# Patient Record
Sex: MALE | Race: WHITE | NOT HISPANIC OR LATINO | Employment: UNEMPLOYED | URBAN - METROPOLITAN AREA
[De-identification: names, ages, dates, MRNs, and addresses within clinical notes are randomized per-mention and may not be internally consistent; named-entity substitution may affect disease eponyms.]

---
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
## 2025-01-21 ENCOUNTER — TELEPHONE (OUTPATIENT)
Dept: DERMATOLOGY | Facility: CLINIC | Age: 20
End: 2025-01-21
Payer: COMMERCIAL

## 2025-01-21 NOTE — TELEPHONE ENCOUNTER
"Attempted PT contact for Xeljanz Tab 5mg from [facility]. \"We have made several attempts to contact the PT, please notify us if there have been changes to PT's therapy or updated contact number\"   Order #: [id number]  Tel: [phone]  Fax: [phone]    Pavel Segovia MA on 1/21/2025 at 12:01 PM    "

## 2025-04-15 ENCOUNTER — VIRTUAL VISIT (OUTPATIENT)
Dept: PHARMACY | Facility: CLINIC | Age: 20
End: 2025-04-15
Attending: DERMATOLOGY
Payer: COMMERCIAL

## 2025-04-15 DIAGNOSIS — M33.13 DERMATOMYOSITIS (H): Primary | ICD-10-CM

## 2025-04-15 NOTE — PROGRESS NOTES
Medication Therapy Management (MTM) Encounter    ASSESSMENT:                            Medication Adherence/Access: No issues identified.    Juvenile Dermatomyositis   Adam is doing well on medication therapy with Xeljanz and hydroxychloroquine and would benefit from continuation. Discussed need for updated lab monitoring, he has a plan in place to get these done with his next office visit. Also discussed need for annual eye exam while on therapy with hydroxychloroquine and has an ophthalmology appointment scheduled for May.    PLAN:                            Continue Xeljanz and hydroxychloroquine.  Annual eye exam while on therapy with hydroxychloroquine, last done 5/2024.  Continue triamcinolone ointment as needed.  Lab monitoring: CBC, CMP, CK, CRP, lipids every 3-4 months     Follow-up: with Dr. Matos 6/9/2025, with me in around 6 months for follow-up and [facility].    SUBJECTIVE/OBJECTIVE:                          Adam Pedraza is a 19 year old male seen for a follow-up visit.       Reason for visit: Xeljanz, hydroxychloroquine follow-up.    Allergies/ADRs: Reviewed in chart and None  Past Medical History: Reviewed in chart  Tobacco: He reports that he has never smoked. He has never been exposed to tobacco smoke. He has never used smokeless tobacco.  Alcohol: Reviewed in chart    Medication Adherence/Access: no issues reported.    Juvenile Dermatomyositis   - Xeljanz 5 mg twice daily   - Hydroxychloroquine 400mg every day  (last eye exam 5/17/2024)  - Triamcinolone 0.1% ointment twice daily as needed to foot     Side effects: none reported.    Patient reports that he seems to be stable. Rash has been fairly consistent on his hand but seems like it is getting better slowly. Thinks that it is starting to look lighter. Foot has completely resolved. Tolerating the medications well without concern for side effects. No issues with specialty pharmacy or insurance.     Symptoms: rash    Affected areas  include the hand, foot - resolved     Specialist: Dr. Santos Matos MD, Dermatology. Last visit on 11/27/2024.     Previous treatment:   - Mycophenolate   - Methotrexate oral  - Prednisone    Pre-Biologic Screening:   Hep B Core Antibody  Non-reactive (2/21/2023)    Hep C Antibody  Non-reactive (2/21/2023)    Quantiferon TB Gold Negative (2/21/2023)       Last lab monitoring completed: 11/27/2024    Immunization History   Pneumococcal  Prevnar-13: Primary series  Prevnar-20: 6/13/2024 Up-to-date   Tetanus/Tdap  Up-to-date   Shingrix Up-to-date   All patients on biologics should avoid live vaccines (varicella/VZV, intranasal influenza, MMR, or yellow fever vaccine (if traveling))       Today's Vitals: There were no vitals taken for this visit.  ----------------    I spent 10 minutes with this patient today. All changes were made via collaborative practice agreement with Santos Matos.     A summary of these recommendations was sent via [employer].    Lynda Gonzales, PharmD  Medication Therapy Management Pharmacist  [facility]  Phone: [phone]    Telemedicine Visit Details  The patient's medications can be safely assessed via a telemedicine encounter.  Type of service:  Telephone visit  Originating Location (pt. Location): Home    Distant Location (provider location):  Off-site  Start Time: 9:30 AM  End Time: 9:40 AM     Medication Therapy Recommendations  No medication therapy recommendations to display

## 2025-04-15 NOTE — PATIENT INSTRUCTIONS
"Recommendations from today's MTM visit:                                                      Continue Xeljanz and hydroxychloroquine.  Annual eye exam while on therapy with hydroxychloroquine, last done 5/2024.  Continue triamcinolone ointment as needed.  Lab monitoring: CBC, CMP, CK, CRP, lipids every 3-4 months     Follow-up: with Dr. Matos 6/9/2025, with me in around 6 months for follow-up and via [employer] as needed.    It was great speaking with you today.  I value your experience and would be very thankful for your time in providing feedback in our clinic survey. In the next few days, you may receive an email or text message from [employer] with a link to a survey related to your  clinical pharmacist.\"     To schedule another MTM appointment, please call the clinic directly or you may call the MTM scheduling line at [phone].    My Clinical Pharmacist's contact information:                                                      Please feel free to contact me with any questions or concerns you have.      Lynda Gonzales, PharmD  Medication Therapy Management Pharmacist  [facility]  Phone: [phone]   "

## 2025-04-28 ENCOUNTER — TELEPHONE (OUTPATIENT)
Dept: DERMATOLOGY | Facility: CLINIC | Age: 20
End: 2025-04-28
Payer: COMMERCIAL

## 2025-04-28 NOTE — TELEPHONE ENCOUNTER
Called patient and LVM to call back [phone]. Dr. Matos has OLIVER slot at 12:15 PM on 11/26/25.    Wendy Dunne on 4/28/2025 at 8:35 AM

## 2025-04-28 NOTE — TELEPHONE ENCOUNTER
[facility] Call Center    Phone Message    May a detailed message be left on voicemail: yes     Reason for Call: Appointment Intake    Referring Provider Name: NA  Diagnosis and/or Symptoms: dermatomyositis follow-up  Pt's Appt for 11/26 was canceled, due to provider. Pt was here for Thanksgiving.   If we are unable to reschedule a different time 11/26, the next available dates pt is available is the week in Jan is week of 05, week of 22, or week of 29  Only the [facility] location.   Please call pt's mom Sonia (on the consents to communicate) at [phone]. Thanks     Action Taken: Message routed to:  Adult Clinics: Dermatology p [id number]    Travel Screening: Not Applicable     Date of Service:

## 2025-04-29 NOTE — TELEPHONE ENCOUNTER
Called pt but the mailbox was full and unable to LVM.    Cindy Chase LPN on 4/29/2025 at 8:47 AM

## 2025-04-29 NOTE — TELEPHONE ENCOUNTER
Talked to mom and rescheduled Nov 26th appt for Dec 22nd at 0845    Cindy Chase LPN on 4/29/2025 at 9:02 AM

## 2025-06-06 NOTE — PROGRESS NOTES
[facility] Dermatology Note    Encounter Date: Jun 9, 2025    Dermatology Problem List:  1.  Juvenile dermatomyositis, with history of cutaneous and muscle involvement  - s/p bx 10/20/2022 ([facility])  - +CK 3900 (3/2023), now normalized  - +DEREK, negative myositis panel  - current tx: tofacitinib 5mg bid (5/16/24-present), hydroxychloroquine 400 mg daily (3/20/23 - present)  - in reserve: IVIg, anifrolumab  - past: oral prednisone 3/20/23 - 5/9/23, mycophenolate (off 6/2024), methotrexate 25 mg (3/20/23 - 11/27/24)  - History of KOH-R dorsal foot (5/16/24)    ______________________________________    Impression/Plan:    Juvenile dermatomyositis: overall doing very well on tofacitinib 5 mg BID and hydroxychloroquine 400mg daily. Minimal skin activity and no muscle or pulmonary symptoms.  - Cont tofacitinib 5 mg BID, hydroxychloroquine 400 mg daily, topicals PRN. Renewed today.   - check CBC, CMP, CK, CRP, lipids today.       Follow-up in as scheduled 12/2025.       Staff Involved:  Staff/Scribe/Resident    Resident:  I saw and discussed the patient with the attending physician, Dr. Matos.     Bolivar Mcdaniels MD, PhD  PGY-4 Dermatology Resident     Scribe Disclosure:   I, Michelle Escamilla, am serving as a scribe to document services personally performed by Santos Matos MD based on data collection and the provider's statements to me.     Provider Disclosure:   The documentation recorded by the scribe accurately reflects the services I personally performed and the decisions made by me.    Staff Physician Comments:   I saw and evaluated the patient with the resident and I edited the assessment and plan as documented in the note. I was present for the examination.    Santos Matos MD   of Dermatology  Department of Dermatology  [facility]              CC:   Chief Complaint   Patient presents with    RECHECK      Juvenile dermatomyositis.  Current treatment: tofacitinib 5 mg BID, hydroxychloroquine       History of Present Illness:  Mr. Eduardo Pedraza is a 20 year old male who presents as a return patient.    Today, patient reports he is doing very well. Tapered off methotrexate late 2024 and has continued to do well, no flares, no concerns today. Mild involvement on the dorsal hands but much improved from prior and not bothersome. No weakness/pain/fatigue/SOB.     Labs:  Component      Latest Ref Rng 11/27/2024  8:28 AM   WBC      4.0 - 11.0 10e3/uL 3.9 (L)    RBC Count      4.40 - 5.90 10e6/uL 5.27    Hemoglobin      13.3 - 17.7 g/dL 16.7    Hematocrit      40.0 - 53.0 % 46.5    MCV      78 - 100 fL 88    MCH      26.5 - 33.0 pg 31.7    MCHC      31.5 - 36.5 g/dL 35.9    RDW      10.0 - 15.0 % 12.5    Platelet Count      150 - 450 10e3/uL 195    % Neutrophils      % 60    % Lymphocytes      % 26    % Monocytes      % 11    % Eosinophils      % 2    % Basophils      % 1    % Immature Granulocytes      % 0    NRBC/W      <1 /100 0    Absolute Neutrophil      1.6 - 8.3 10e3/uL 2.4    Absolute Lymphocytes      0.8 - 5.3 10e3/uL 1.0    Absolute Monocytes      0.0 - 1.3 10e3/uL 0.4    Absolute Eosinophils      0.0 - 0.7 10e3/uL 0.1    Absolute Basophils      0.0 - 0.2 10e3/uL 0.0    Absolute Immature Granulocytes      <=0.4 10e3/uL 0.0    Absolute NRBCs      10e3/uL 0.0    Sodium      135 - 145 mmol/L 139    Potassium      3.4 - 5.3 mmol/L 4.0    Carbon Dioxide (CO2)      22 - 29 mmol/L 26    Anion Gap      7 - 15 mmol/L 10    Urea Nitrogen      6.0 - 20.0 mg/dL 11.8    Creatinine      0.67 - 1.17 mg/dL 0.78    GFR Estimate      >60 mL/min/1.73m2 >90    Calcium      8.8 - 10.4 mg/dL 9.4    Chloride      98 - 107 mmol/L 103    Glucose      70 - 99 mg/dL 93    Alkaline Phosphatase      65 - 260 U/L 64 (L)    AST      0 - 35 U/L 41 (H)    ALT      0 - 50 U/L 34    Protein Total      6.4 - 8.3 g/dL 6.9    Albumin      3.5 - 5.2  g/dL 4.6    Bilirubin Total      <=1.2 mg/dL 0.8    Patient Fasting? Yes    Patient Fasting? Yes    Cholesterol      <170 mg/dL 143    Triglycerides      <90 mg/dL 31    HDL Cholesterol      >45 mg/dL 75    LDL Cholesterol Calculated      <110 mg/dL 62    Non HDL Cholesterol      <120 mg/dL 68    CK Total      39 - 308 U/L 164    CRP Inflammation      <5.00 mg/L <3.00       Legend:  (L) Low  (H) High    Physical exam:  Vitals: There were no vitals taken for this visit.  GEN: This is a well developed, well-nourished male in no acute distress, in a pleasant mood.    SKIN: Hayes phototype II  - Faint red/pink papules on the MCP/PIP, most prominent on index/middle fingers. Improved from prior.   - No other lesions of concern on areas examined.         Past Medical History:   Past Medical History:   Diagnosis Date    Asthma     Distal radius fracture, left 12/14/2019    Juvenile dermatomyositis (H)      Past Surgical History:   Procedure Laterality Date    ADENOIDECTOMY      TONSILLECTOMY         Social History:   reports that he has never smoked. He has never been exposed to tobacco smoke. He has never used smokeless tobacco.    Family History:  Family History   Problem Relation Age of Onset    Glaucoma No family hx of     Macular Degeneration No family hx of        Medications:  Current Outpatient Medications   Medication Sig Dispense Refill    hydroxychloroquine (PLAQUENIL) 200 MG tablet Take 2 tablets (400 mg) by mouth daily. 180 tablet 3    tofacitinib (XELJANZ) 5 MG tablet Take 1 tablet (5 mg) by mouth 2 times daily. 60 tablet 11    triamcinolone (KENALOG) 0.1 % external ointment Apply topically 2 times daily. For affected areas of rash on feet 80 g 11     No Known Allergies

## 2025-06-09 ENCOUNTER — TELEPHONE (OUTPATIENT)
Dept: DERMATOLOGY | Facility: CLINIC | Age: 20
End: 2025-06-09

## 2025-06-09 ENCOUNTER — RESULTS FOLLOW-UP (OUTPATIENT)
Dept: DERMATOLOGY | Facility: CLINIC | Age: 20
End: 2025-06-09

## 2025-06-09 ENCOUNTER — OFFICE VISIT (OUTPATIENT)
Dept: DERMATOLOGY | Facility: CLINIC | Age: 20
End: 2025-06-09
Payer: COMMERCIAL

## 2025-06-09 DIAGNOSIS — M33.13 DERMATOMYOSITIS (H): Primary | ICD-10-CM

## 2025-06-09 DIAGNOSIS — D84.9 IMMUNOSUPPRESSION: ICD-10-CM

## 2025-06-09 LAB
ALBUMIN SERPL BCG-MCNC: 4.6 G/DL (ref 3.5–5.2)
ALP SERPL-CCNC: 58 U/L (ref 40–150)
ALT SERPL W P-5'-P-CCNC: 30 U/L (ref 0–70)
ANION GAP SERPL CALCULATED.3IONS-SCNC: 9 MMOL/L (ref 7–15)
AST SERPL W P-5'-P-CCNC: 36 U/L (ref 0–45)
BASOPHILS # BLD AUTO: 0 10E3/UL (ref 0–0.2)
BASOPHILS NFR BLD AUTO: 1 %
BILIRUB SERPL-MCNC: 0.9 MG/DL
BUN SERPL-MCNC: 12.1 MG/DL (ref 6–20)
CALCIUM SERPL-MCNC: 9.8 MG/DL (ref 8.8–10.4)
CHLORIDE SERPL-SCNC: 106 MMOL/L (ref 98–107)
CHOLEST SERPL-MCNC: 129 MG/DL
CK SERPL-CCNC: 184 U/L (ref 39–308)
CREAT SERPL-MCNC: 0.88 MG/DL (ref 0.67–1.17)
CRP SERPL-MCNC: <3 MG/L
EGFRCR SERPLBLD CKD-EPI 2021: >90 ML/MIN/1.73M2
EOSINOPHIL # BLD AUTO: 0.1 10E3/UL (ref 0–0.7)
EOSINOPHIL NFR BLD AUTO: 2 %
ERYTHROCYTE [DISTWIDTH] IN BLOOD BY AUTOMATED COUNT: 12.3 % (ref 10–15)
FASTING STATUS PATIENT QL REPORTED: NO
FASTING STATUS PATIENT QL REPORTED: NO
GLUCOSE SERPL-MCNC: 93 MG/DL (ref 70–99)
HCO3 SERPL-SCNC: 25 MMOL/L (ref 22–29)
HCT VFR BLD AUTO: 43.9 % (ref 40–53)
HDLC SERPL-MCNC: 70 MG/DL
HGB BLD-MCNC: 15.9 G/DL (ref 13.3–17.7)
IMM GRANULOCYTES # BLD: 0 10E3/UL
IMM GRANULOCYTES NFR BLD: 0 %
LDLC SERPL CALC-MCNC: 50 MG/DL
LYMPHOCYTES # BLD AUTO: 1.2 10E3/UL (ref 0.8–5.3)
LYMPHOCYTES NFR BLD AUTO: 28 %
MCH RBC QN AUTO: 31 PG (ref 26.5–33)
MCHC RBC AUTO-ENTMCNC: 36.2 G/DL (ref 31.5–36.5)
MCV RBC AUTO: 86 FL (ref 78–100)
MONOCYTES # BLD AUTO: 0.4 10E3/UL (ref 0–1.3)
MONOCYTES NFR BLD AUTO: 9 %
NEUTROPHILS # BLD AUTO: 2.5 10E3/UL (ref 1.6–8.3)
NEUTROPHILS NFR BLD AUTO: 60 %
NONHDLC SERPL-MCNC: 59 MG/DL
NRBC # BLD AUTO: 0 10E3/UL
NRBC BLD AUTO-RTO: 0 /100
PLATELET # BLD AUTO: 196 10E3/UL (ref 150–450)
POTASSIUM SERPL-SCNC: 4.3 MMOL/L (ref 3.4–5.3)
PROT SERPL-MCNC: 7 G/DL (ref 6.4–8.3)
RBC # BLD AUTO: 5.13 10E6/UL (ref 4.4–5.9)
SODIUM SERPL-SCNC: 140 MMOL/L (ref 135–145)
TRIGL SERPL-MCNC: 46 MG/DL
WBC # BLD AUTO: 4.2 10E3/UL (ref 4–11)

## 2025-06-09 RX ORDER — TRIAMCINOLONE ACETONIDE 1 MG/G
OINTMENT TOPICAL 2 TIMES DAILY
Qty: 80 G | Refills: 11 | Status: SHIPPED | OUTPATIENT
Start: 2025-06-09

## 2025-06-09 RX ORDER — HYDROXYCHLOROQUINE SULFATE 200 MG/1
400 TABLET, FILM COATED ORAL DAILY
Qty: 180 TABLET | Refills: 3 | Status: SHIPPED | OUTPATIENT
Start: 2025-06-09

## 2025-06-09 NOTE — TELEPHONE ENCOUNTER
PA Initiation    Medication: XELJANZ 5 MG PO TABS  Insurance Company: [employer] ([facility]) - Phone [phone] Fax [phone]  Pharmacy Filling the Rx: [facility] - [place], [place] - [address]  Filling Pharmacy Phone: [phone]  Filling Pharmacy Fax: [phone]  Start Date: 6/9/2025         Thank you,     Augustine Mahoney [facility]  Pharmacy Clinic [facility]  [email or web address]   Phone: [phone]  Fax: [phone]

## 2025-06-09 NOTE — LETTER
6/9/2025      Eduardo Pedraza  [address] [place] [address]      Dear Colleague,    Thank you for referring your patient, Eduardo Pedraza, to the [facility]. Please see a copy of my visit note below.    [facility] Dermatology Note    Encounter Date: Jun 9, 2025    Dermatology Problem List:  1.  Juvenile dermatomyositis, with history of cutaneous and muscle involvement  - s/p bx 10/20/2022 ([facility])  - +CK 3900 (3/2023), now normalized  - +DEREK, negative myositis panel  - current tx: tofacitinib 5mg bid (5/16/24-present), hydroxychloroquine 400 mg daily (3/20/23 - present)  - in reserve: IVIg, anifrolumab  - past: oral prednisone 3/20/23 - 5/9/23, mycophenolate (off 6/2024), methotrexate 25 mg (3/20/23 - 11/27/24)  - History of KOH-R dorsal foot (5/16/24)    ______________________________________    Impression/Plan:    Juvenile dermatomyositis: overall doing very well on tofacitinib 5 mg BID and hydroxychloroquine 400mg daily. Minimal skin activity and no muscle or pulmonary symptoms.  - Cont tofacitinib 5 mg BID, hydroxychloroquine 400 mg daily, topicals PRN. Renewed today.   - check CBC, CMP, CK, CRP, lipids today.       Follow-up in as scheduled 12/2025.       Staff Involved:  Staff/Scribe/Resident    Resident:  I saw and discussed the patient with the attending physician, Dr. Matos.     Bolivar Mcdaniels MD, PhD  PGY-4 Dermatology Resident     Scribe Disclosure:   I, Michelle Escamilla, am serving as a scribe to document services personally performed by Santos Matos MD based on data collection and the provider's statements to me.     Provider Disclosure:   The documentation recorded by the scribe accurately reflects the services I personally performed and the decisions made by me.    Staff Physician Comments:   I saw and evaluated the patient with the resident and I edited the assessment and plan as documented in the note.  I was present for the examination.    Santos Matos MD   of Dermatology  Department of Dermatology  [facility]              CC:   Chief Complaint   Patient presents with     RECHECK     Juvenile dermatomyositis.  Current treatment: tofacitinib 5 mg BID, hydroxychloroquine       History of Present Illness:  Mr. Eduardo Pedraza is a 20 year old male who presents as a return patient.    Today, patient reports he is doing very well. Tapered off methotrexate late 2024 and has continued to do well, no flares, no concerns today. Mild involvement on the dorsal hands but much improved from prior and not bothersome. No weakness/pain/fatigue/SOB.     Labs:  Component      Latest Ref Rng 11/27/2024  8:28 AM   WBC      4.0 - 11.0 10e3/uL 3.9 (L)    RBC Count      4.40 - 5.90 10e6/uL 5.27    Hemoglobin      13.3 - 17.7 g/dL 16.7    Hematocrit      40.0 - 53.0 % 46.5    MCV      78 - 100 fL 88    MCH      26.5 - 33.0 pg 31.7    MCHC      31.5 - 36.5 g/dL 35.9    RDW      10.0 - 15.0 % 12.5    Platelet Count      150 - 450 10e3/uL 195    % Neutrophils      % 60    % Lymphocytes      % 26    % Monocytes      % 11    % Eosinophils      % 2    % Basophils      % 1    % Immature Granulocytes      % 0    NRBC/W      <1 /100 0    Absolute Neutrophil      1.6 - 8.3 10e3/uL 2.4    Absolute Lymphocytes      0.8 - 5.3 10e3/uL 1.0    Absolute Monocytes      0.0 - 1.3 10e3/uL 0.4    Absolute Eosinophils      0.0 - 0.7 10e3/uL 0.1    Absolute Basophils      0.0 - 0.2 10e3/uL 0.0    Absolute Immature Granulocytes      <=0.4 10e3/uL 0.0    Absolute NRBCs      10e3/uL 0.0    Sodium      135 - 145 mmol/L 139    Potassium      3.4 - 5.3 mmol/L 4.0    Carbon Dioxide (CO2)      22 - 29 mmol/L 26    Anion Gap      7 - 15 mmol/L 10    Urea Nitrogen      6.0 - 20.0 mg/dL 11.8    Creatinine      0.67 - 1.17 mg/dL 0.78    GFR Estimate      >60 mL/min/1.73m2 >90    Calcium      8.8 - 10.4 mg/dL 9.4     Chloride      98 - 107 mmol/L 103    Glucose      70 - 99 mg/dL 93    Alkaline Phosphatase      65 - 260 U/L 64 (L)    AST      0 - 35 U/L 41 (H)    ALT      0 - 50 U/L 34    Protein Total      6.4 - 8.3 g/dL 6.9    Albumin      3.5 - 5.2 g/dL 4.6    Bilirubin Total      <=1.2 mg/dL 0.8    Patient Fasting? Yes    Patient Fasting? Yes    Cholesterol      <170 mg/dL 143    Triglycerides      <90 mg/dL 31    HDL Cholesterol      >45 mg/dL 75    LDL Cholesterol Calculated      <110 mg/dL 62    Non HDL Cholesterol      <120 mg/dL 68    CK Total      39 - 308 U/L 164    CRP Inflammation      <5.00 mg/L <3.00       Legend:  (L) Low  (H) High    Physical exam:  Vitals: There were no vitals taken for this visit.  GEN: This is a well developed, well-nourished male in no acute distress, in a pleasant mood.    SKIN: Hayes phototype II  - Faint red/pink papules on the MCP/PIP, most prominent on index/middle fingers. Improved from prior.   - No other lesions of concern on areas examined.         Past Medical History:   Past Medical History:   Diagnosis Date     Asthma      Distal radius fracture, left 12/14/2019     Juvenile dermatomyositis (H)      Past Surgical History:   Procedure Laterality Date     ADENOIDECTOMY       TONSILLECTOMY         Social History:   reports that he has never smoked. He has never been exposed to tobacco smoke. He has never used smokeless tobacco.    Family History:  Family History   Problem Relation Age of Onset     Glaucoma No family hx of      Macular Degeneration No family hx of        Medications:  Current Outpatient Medications   Medication Sig Dispense Refill     hydroxychloroquine (PLAQUENIL) 200 MG tablet Take 2 tablets (400 mg) by mouth daily. 180 tablet 3     tofacitinib (XELJANZ) 5 MG tablet Take 1 tablet (5 mg) by mouth 2 times daily. 60 tablet 11     triamcinolone (KENALOG) 0.1 % external ointment Apply topically 2 times daily. For affected areas of rash on feet 80 g 11     No  Known Allergies            Again, thank you for allowing me to participate in the care of your patient.        Sincerely,        Santos Matos MD    Electronically signed

## 2025-06-09 NOTE — NURSING NOTE
Eduardo Pedraza's goals for this visit include:   Chief Complaint   Patient presents with    RECHECK     Juvenile dermatomyositis.  Current treatment: tofacitinib 5 mg BID, hydroxychloroquine       He requests these members of his care team be copied on today's visit information:     PCP: Anne-Marie Aldridge    Referring Provider:  Referred Self, MD  No address on file    There were no vitals taken for this visit.    Do you need any medication refills at today's visit? Tess Rosas [facility]

## 2025-06-11 NOTE — TELEPHONE ENCOUNTER
Prior Authorization Approval    Medication: XELJANZ 5 MG PO TABS  Authorization Effective Date: 6/9/2025  Authorization Expiration Date: 6/9/2026  Approved Dose/Quantity: 60 tablets per 30 days  Reference #: [id number]   Insurance Company: [employer] ([facility]) - Phone [phone] Fax [phone]  Expected CoPay: $    CoPay Card Available:      Financial Assistance Needed:   Which Pharmacy is filling the prescription: [facility] - [address]  Pharmacy Notified: No - renewal only  Patient Notified: No - renewal only        Thank you,     Augustine Mahoney, [facility]  Pharmacy Clinic [facility]  [email or web address]   Phone: [phone]  Fax: [phone]

## 2025-06-11 NOTE — PROGRESS NOTES
Medication Therapy Management (MTM) Encounter    ASSESSMENT:                            Medication Adherence/Access: See below for considerations.    Juvenile Dermatomyositis: Stable, would benefit from continuing Xeljanz. He is up to date on lab monitoring and annual eye exam with hydroxychloroquine treatment. Encouraged patient to contact the Dermatology clinic in the event they have questions.     PLAN:                            Continue Xeljanz (tofacitinib) 5 mg twice daily   MTM to send lab reminders in September for every 3 month lab monitoring    Follow-up: as needed via MyChart and in 3-6 months via telephone to continue assessing safety and efficacy.    SUBJECTIVE/OBJECTIVE:                          Adam Pedraza is a 20 year old male called for an initial visit. He was referred to me from Dr. Santos Matos MD.      Reason for visit: Xeljanz (tofacitinib) continuation.    Allergies/ADRs: Reviewed in chart.  Past Medical History: Reviewed in chart.  Tobacco: He reports that he has never smoked. He has never been exposed to tobacco smoke. He has never used smokeless tobacco.  Alcohol: Reviewed in chart.    Medication Adherence/Access: PA renewal approved    Juvenile Dermatomyositis:   Current treatment:  - Xeljanz 5 mg twice daily   Started 5/16/2024  [facility]  PA expires 6/9/2026    - Hydroxychloroquine 400mg every day - last eye exam 5/23/2025  - Triamcinolone 0.1% ointment twice daily as needed to foot     Symptoms: rash on hands getting better    Side effects: denies    Specialist: Dr. Santos Matos MD, Dermatology. Last visit on 6/9/2025. The following was recommended:   Juvenile dermatomyositis: overall doing very well on tofacitinib 5 mg BID and hydroxychloroquine 400mg daily. Minimal skin activity and no muscle or pulmonary symptoms.  - Cont tofacitinib 5 mg BID, hydroxychloroquine 400 mg daily, topicals PRN. Renewed today.   - check CBC, CMP, CK, CRP, lipids today.    Previous  treatment:   - Mycophenolate (off 6/2024)   - Methotrexate oral 25 mg weekly (3/20/23 - 11/27/24)   - Prednisone 3/20/23 - 5/9/23     Pre-Biologic Screening:   Hep B Core Antibody  Non-reactive (2/21/2023)    Hep C Antibody  Non-reactive (2/21/2023)    Quantiferon TB Gold Negative (2/21/2023)       CBC 6/9/2025  CMP 6/9/2025, eGFR >90 mL/min  CK, CRP 6/9/2025     Recent Labs   Lab Test 06/09/25  0735 11/27/24  0828   CHOL 129 143   HDL 70 75   LDL 50 62   TRIG 46 31     Immunization History   Pneumococcal  Prevnar-13: Primary series  Prevnar-20: 6/13/2024 Up-to-date   Tetanus/Tdap  Up-to-date   Shingrix Up-to-date   All patients on biologics should avoid live vaccines (varicella/VZV, intranasal influenza, MMR, or yellow fever vaccine (if traveling))          Today's Vitals: There were no vitals taken for this visit.  ----------------      I spent 3 minutes with this patient today. All changes were made via collaborative practice agreement with Dr. Santos Matos MD.      A summary of these recommendations was sent via [employer] and via clinic portal.    Dea Alfaro, MiltonD, MPH  Medication Therapy Management Pharmacist  [facility]    Telemedicine Visit Details  Type of service:  Telephone visit  Originating Location (pt. Location): Home    Distant Location (provider location):  Off-site  Start Time: 10:00 AM  End Time: 10:03 AM     Medication Therapy Recommendations  No medication therapy recommendations to display

## 2025-06-12 ENCOUNTER — VIRTUAL VISIT (OUTPATIENT)
Dept: PHARMACY | Facility: CLINIC | Age: 20
End: 2025-06-12
Attending: DERMATOLOGY
Payer: COMMERCIAL

## 2025-06-12 DIAGNOSIS — M33.13 DERMATOMYOSITIS (H): Primary | ICD-10-CM

## 2025-06-23 DIAGNOSIS — M33.13 DERMATOMYOSITIS (H): ICD-10-CM

## 2025-06-23 NOTE — PROGRESS NOTES
[facility] to [facility] preferred [facility], per CPA    Dea Alfaro, MiltonD, MPH  Medication Therapy Management Pharmacist  [facility] Dermatology [facility]

## 2025-07-19 ENCOUNTER — HEALTH MAINTENANCE LETTER (OUTPATIENT)
Age: 20
End: 2025-07-19